# Patient Record
Sex: FEMALE
[De-identification: names, ages, dates, MRNs, and addresses within clinical notes are randomized per-mention and may not be internally consistent; named-entity substitution may affect disease eponyms.]

---

## 2020-12-29 PROBLEM — Z00.00 ENCOUNTER FOR PREVENTIVE HEALTH EXAMINATION: Status: ACTIVE | Noted: 2020-12-29

## 2020-12-30 ENCOUNTER — APPOINTMENT (OUTPATIENT)
Dept: ORTHOPEDIC SURGERY | Facility: CLINIC | Age: 30
End: 2020-12-30
Payer: MEDICAID

## 2020-12-30 VITALS — WEIGHT: 210 LBS | BODY MASS INDEX: 33.75 KG/M2 | HEIGHT: 66 IN

## 2020-12-30 DIAGNOSIS — Z86.79 PERSONAL HISTORY OF OTHER DISEASES OF THE CIRCULATORY SYSTEM: ICD-10-CM

## 2020-12-30 DIAGNOSIS — Z78.9 OTHER SPECIFIED HEALTH STATUS: ICD-10-CM

## 2020-12-30 DIAGNOSIS — S83.251A BUCKET-HANDLE TEAR OF LATERAL MENISCUS, CURRENT INJURY, RIGHT KNEE, INITIAL ENCOUNTER: ICD-10-CM

## 2020-12-30 DIAGNOSIS — Z87.09 PERSONAL HISTORY OF OTHER DISEASES OF THE RESPIRATORY SYSTEM: ICD-10-CM

## 2020-12-30 DIAGNOSIS — Q68.2 CONGENITAL DEFORMITY OF KNEE: ICD-10-CM

## 2020-12-30 DIAGNOSIS — Z56.0 UNEMPLOYMENT, UNSPECIFIED: ICD-10-CM

## 2020-12-30 PROCEDURE — 20610 DRAIN/INJ JOINT/BURSA W/O US: CPT | Mod: RT

## 2020-12-30 PROCEDURE — 73564 X-RAY EXAM KNEE 4 OR MORE: CPT | Mod: RT

## 2020-12-30 PROCEDURE — 99204 OFFICE O/P NEW MOD 45 MIN: CPT | Mod: 25

## 2020-12-30 PROCEDURE — 99072 ADDL SUPL MATRL&STAF TM PHE: CPT

## 2020-12-30 RX ORDER — BUDESONIDE AND FORMOTEROL FUMARATE DIHYDRATE 160; 4.5 UG/1; UG/1
AEROSOL RESPIRATORY (INHALATION)
Refills: 0 | Status: ACTIVE | COMMUNITY

## 2020-12-30 RX ORDER — HYDROCHLOROTHIAZIDE 12.5 MG/1
TABLET ORAL
Refills: 0 | Status: ACTIVE | COMMUNITY

## 2020-12-30 SDOH — ECONOMIC STABILITY - INCOME SECURITY: UNEMPLOYMENT, UNSPECIFIED: Z56.0

## 2020-12-30 NOTE — HISTORY OF PRESENT ILLNESS
[Pain Location] : pain [] : right knee [___ mths] : [unfilled] month(s) ago [de-identified] : WALKER WILLINGHAM is a 30 year  old  patient that presents today with Right knee pain. Patient states Right knee pain started October, 2020 during the patient doing home improvements. The pain is predominantly in hamstring and calf along with knee  and is exacerbated by walking , bending and direct pressure . She reports positive locking for over three weeks.

## 2020-12-30 NOTE — DISCUSSION/SUMMARY
[de-identified] : 30 year old female with acute onset of right knee pain. Looks like a locked knee with effusion from a probable bucket handle tear lateral meniscus. She has been walking on this flexed knee over three weeks in significant pain. \par Plan: \par Tap knee today and place xylocaine and cortisone. \par PT for ROM Quad tone but I do believe an MRI is needed to rule out lateral meniscal locked tear vs other lesion such as OCD. \par We will apply for that stat. \par F/U 3 weeks or call with MRI results. \par

## 2020-12-30 NOTE — PHYSICAL EXAM
[de-identified] : Large effusion and locked knee ROM 25-85 degrees only. Lateral Joint tenderness. Positive Jona and Major. Negative Lachman\par After tapping knee of 40 cc joint fluid we got ROM  degrees.  Still positive Jona and Major.  [de-identified] : 4 views of the knee reveal milninmal medial joint narrowing and mild patella malalignment. No acute changes, loose bodies or osteoch defects seen.

## 2020-12-30 NOTE — PROCEDURE
[de-identified] : The right knee was prepped with alchohol and ethyl chloride was used to numb the skin along with 3 cc exylocaine. We placed an 18 guage needle in the suprapatellar pouch and removed 40 cc of joint fluid. Then . A 3 cc injection with 1 cc xylocaine, 1cc sensoricaine and 1 cc depomedrol , was given without complication into the knee joint. Patient instructed that there may be an inflammatory flare for 24 hrs , to use ice or advil if needed\par \par

## 2021-01-04 RX ORDER — MELOXICAM 15 MG/1
15 TABLET ORAL DAILY
Qty: 30 | Refills: 2 | Status: COMPLETED | COMMUNITY
Start: 2021-01-04 | End: 2021-04-04

## 2021-01-20 ENCOUNTER — APPOINTMENT (OUTPATIENT)
Dept: ORTHOPEDIC SURGERY | Facility: CLINIC | Age: 31
End: 2021-01-20
Payer: MEDICAID

## 2021-01-20 DIAGNOSIS — M25.461 EFFUSION, RIGHT KNEE: ICD-10-CM

## 2021-01-20 DIAGNOSIS — Z82.69 FAMILY HISTORY OF OTHER DISEASES OF THE MUSCULOSKELETAL SYSTEM AND CONNECTIVE TISSUE: ICD-10-CM

## 2021-01-20 DIAGNOSIS — Z82.61 FAMILY HISTORY OF ARTHRITIS: ICD-10-CM

## 2021-01-20 DIAGNOSIS — M1A.0620 IDIOPATHIC CHRONIC GOUT, LEFT KNEE, W/OUT TOPHUS (TOPHI): ICD-10-CM

## 2021-01-20 PROCEDURE — 99214 OFFICE O/P EST MOD 30 MIN: CPT

## 2021-01-20 PROCEDURE — 99072 ADDL SUPL MATRL&STAF TM PHE: CPT

## 2021-01-20 RX ORDER — MELOXICAM 15 MG/1
15 TABLET ORAL DAILY
Qty: 30 | Refills: 3 | Status: COMPLETED | COMMUNITY
Start: 2021-01-20 | End: 2021-05-20

## 2021-01-20 NOTE — DISCUSSION/SUMMARY
[de-identified] : 30 year old female returns for followup of severe effusion and knee pain plus limp necessitating crutches. \par SHe is markedly improved s/p tapping knee placing cortisone and oral medrol. \par However still has some medial joint and peripatellar tenderness and marked weakness of QUads. \par MRI was evaluated and read by me personally which shoes no meniscal pathology and no loose body or significant joint wear or chondropenia. \par SHe did have  a large effusion. In this age group and noting improvement with cortisone I believe we need to rule out Collagen vascular etiologies such as Rheumatoid arthritis , Lupus or Ankylosing spondylitis, or gout as the sudden cause of this effusion. These blood test were ordered along with Uric Acid serum test for Gout. \par Now that her effusion is down her full extension is back but Quads are excessively weak and she will need Physical Therapy to get that strength back \par LAbs are pending to R/O the medical etiologies above and PT ordered. SHe could not do since last visit due to Covid quarantine due to friend with positive test. \par SHe will do PT now get labs and F/U in 4-5 weeks. \par I will call her with blood test results and refer if appropriate. . \par We spent significant time today discussing this differential and got a history that some family member had Gout. \par \par

## 2021-01-20 NOTE — HISTORY OF PRESENT ILLNESS
[de-identified] : WALKER WILLINGHAM is a 30 year  old  patient that presents today for follow up visit for Right Knee pain. Patient states that she was unable to have blood drawn at last visit but will have them completed today. Patient expresses that she is feeling better and not having to walk with the cane provided at last visit. However, she complains of pain of her Right knee that intermittently filippo when exacerbated by walking. Denies locking and clicking.

## 2021-01-20 NOTE — PHYSICAL EXAM
[de-identified] : Patient is well nourished, well-developed, in no acute distress, with appropriate mood and affect. The patient is oriented to time, place, and person. Gait evaluation does not reveal a limp. The skin examination does not reveal obvious lesions, lacerations, or ecchymosis.\par Marked decrease in knee effusion and good near full ROM now. \par Negative Lachman and Jona fo left and right knee. Left knee medial joint and lateral patella tenderness. \par  [de-identified] : Old x-rays and new MRI were personally reviewed by me again today. No obvious intra-articular source of the effusion found but she did have a large effusion at the time of scan.

## 2021-01-21 ENCOUNTER — TRANSCRIPTION ENCOUNTER (OUTPATIENT)
Age: 31
End: 2021-01-21

## 2021-01-21 LAB
ERYTHROCYTE [SEDIMENTATION RATE] IN BLOOD BY WESTERGREN METHOD: 34 MM/HR
RHEUMATOID FACT SER QL: <10 IU/ML
URATE SERPL-MCNC: 3.6 MG/DL

## 2021-01-22 LAB — ANA SER IF-ACNC: NEGATIVE

## 2021-01-27 LAB — HLA-B27 RELATED AG QL: NEGATIVE

## 2021-03-03 ENCOUNTER — APPOINTMENT (OUTPATIENT)
Dept: ORTHOPEDIC SURGERY | Facility: CLINIC | Age: 31
End: 2021-03-03

## 2022-04-07 ENCOUNTER — APPOINTMENT (OUTPATIENT)
Dept: ORTHOPEDIC SURGERY | Facility: CLINIC | Age: 32
End: 2022-04-07
Payer: MEDICAID

## 2022-04-07 DIAGNOSIS — M22.2X2 PATELLOFEMORAL DISORDERS, RIGHT KNEE: ICD-10-CM

## 2022-04-07 DIAGNOSIS — M22.2X1 PATELLOFEMORAL DISORDERS, RIGHT KNEE: ICD-10-CM

## 2022-04-07 DIAGNOSIS — S80.01XA CONTUSION OF RIGHT KNEE, INITIAL ENCOUNTER: ICD-10-CM

## 2022-04-07 PROCEDURE — 20610 DRAIN/INJ JOINT/BURSA W/O US: CPT | Mod: RT

## 2022-04-07 PROCEDURE — 99213 OFFICE O/P EST LOW 20 MIN: CPT | Mod: 25

## 2022-04-07 NOTE — HISTORY OF PRESENT ILLNESS
[de-identified] : 33 y/o female presents today for follow up for left knee pain. Patient states she has not been doing physical therapy due to lack of time but has been doing a little better some days are better than others. She also stated that about one month ago she was running for the train, fell and hurt her right knee.Her left knee swelling has not returned . Her work up for inflammatory arthritis signs or markers were all negative except for a slight elevation of sed rate. EDNA RH factor and HLAB27 normal and Uric acide WNL. \par

## 2022-04-07 NOTE — PROCEDURE
[de-identified] : The right knee was prepped with alchohol and ethyl chloride was used to numb the skin. We tapped the knee first removing 15 cc joint fluid. A 3 cc injection with 1 cc xylocaine, 1cc sensoricaine and 1 cc depomedrol , was given without complication into the knee joint. Patient instructed that there may be an inflammatory flare for 24 hrs , to use ice or advil if needed\par \par

## 2022-04-07 NOTE — DISCUSSION/SUMMARY
[de-identified] : Left knee now not very symptomatic. She never went to PT yet and now she had a new direct blow fall on patella right knee with some PF symptoms no evidence of Fx or subluxation. \par Plan '\par Tapped knee today removing 15 cc fluid No blood. \par Injected cortisone \par Physical therapy 1x/week for 6 weeks plus home program\par F/U prn \par

## 2022-04-07 NOTE — PHYSICAL EXAM
[de-identified] : Left knee full ROM no effusion today\par Right knee grade 1 effusion nerg Patt and alchman \par Neg Varus valgus stress tests \par Positive patellar and peripateelar tenderness. \par